# Patient Record
Sex: MALE | Race: WHITE | Employment: PART TIME | URBAN - METROPOLITAN AREA
[De-identification: names, ages, dates, MRNs, and addresses within clinical notes are randomized per-mention and may not be internally consistent; named-entity substitution may affect disease eponyms.]

---
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
## 2021-02-06 ENCOUNTER — HOSPITAL ENCOUNTER (OUTPATIENT)
Age: 21
Discharge: HOME | End: 2021-02-06
Payer: COMMERCIAL

## 2021-02-06 VITALS
OXYGEN SATURATION: 98 % | HEART RATE: 101 BPM | HEIGHT: 69 IN | TEMPERATURE: 97 F | BODY MASS INDEX: 26.36 KG/M2 | SYSTOLIC BLOOD PRESSURE: 149 MMHG | RESPIRATION RATE: 18 BRPM | WEIGHT: 178 LBS | DIASTOLIC BLOOD PRESSURE: 90 MMHG

## 2021-02-06 DIAGNOSIS — Z01.84 COVID-19 VIRUS ANTIBODY NEGATIVE: ICD-10-CM

## 2021-02-06 DIAGNOSIS — J06.9 UPPER RESPIRATORY TRACT INFECTION, UNSPECIFIED TYPE: Primary | ICD-10-CM

## 2021-02-06 LAB — SARS-COV-2 RNA RESP QL NAA+PROBE: NOT DETECTED

## 2021-02-06 PROCEDURE — 99213 OFFICE O/P EST LOW 20 MIN: CPT

## 2021-02-06 PROCEDURE — 99212 OFFICE O/P EST SF 10 MIN: CPT

## 2021-02-06 PROCEDURE — 99202 OFFICE O/P NEW SF 15 MIN: CPT

## 2021-02-06 NOTE — ED INITIAL ASSESSMENT (HPI)
Pt states having rt sided throat pain radiating to rt ear. States having post nasal drip. Denies fever or cough. Denies sinus pain.

## 2021-02-06 NOTE — ED PROVIDER NOTES
Patient Seen in: [facility]      History   Patient presents with:  Sore Throat    Stated Complaint: SINUS INFECTION X 4 DAYS    HPI/Subjective:   HPI    CHIEF COMPLAINT: Sinus drainage, right ear discomfort, mild sore throat    HISTORY OF Pulse 101   Temp 97.4 °F (36.3 °C) (Temporal)   Resp 18   Ht 175.3 cm (5' 9\")   Wt 80.7 kg   SpO2 98%   BMI 26.29 kg/m²         Physical Exam    Vital signs and nursing notes reviewed    General Appearance: alert and oriented x 4, no acute distress  Head: to help prevent relapse or worsening. I discussed the case with the patient and they had no questions, complaints, or concerns. Patient states they understand diagnosis, followup plan and agree with and understand  discharge instructions and plan.  I ans

## 2021-09-21 ENCOUNTER — TELEMEDICINE (OUTPATIENT)
Dept: FAMILY MEDICINE CLINIC | Facility: CLINIC | Age: 21
End: 2021-09-21

## 2021-09-21 ENCOUNTER — LAB ENCOUNTER (OUTPATIENT)
Dept: LAB | Facility: HOSPITAL | Age: 21
End: 2021-09-21
Attending: STUDENT IN AN ORGANIZED HEALTH CARE EDUCATION/TRAINING PROGRAM
Payer: COMMERCIAL

## 2021-09-21 ENCOUNTER — TELEPHONE (OUTPATIENT)
Dept: FAMILY MEDICINE CLINIC | Facility: CLINIC | Age: 21
End: 2021-09-21

## 2021-09-21 DIAGNOSIS — J06.9 VIRAL URI WITH COUGH: Primary | ICD-10-CM

## 2021-09-21 DIAGNOSIS — R09.82 POSTNASAL DRIP: ICD-10-CM

## 2021-09-21 DIAGNOSIS — J06.9 VIRAL URI WITH COUGH: ICD-10-CM

## 2021-09-21 LAB
ADENOVIRUS PCR:: NOT DETECTED
B PARAPERT DNA SPEC QL NAA+PROBE: NOT DETECTED
B PERT DNA SPEC QL NAA+PROBE: NOT DETECTED
C PNEUM DNA SPEC QL NAA+PROBE: NOT DETECTED
CORONAVIRUS 229E PCR:: NOT DETECTED
CORONAVIRUS HKU1 PCR:: NOT DETECTED
CORONAVIRUS NL63 PCR:: NOT DETECTED
CORONAVIRUS OC43 PCR:: NOT DETECTED
FLUAV RNA SPEC QL NAA+PROBE: NOT DETECTED
FLUBV RNA SPEC QL NAA+PROBE: NOT DETECTED
METAPNEUMOVIRUS PCR:: NOT DETECTED
MYCOPLASMA PNEUMONIA PCR:: NOT DETECTED
PARAINFLUENZA 1 PCR:: NOT DETECTED
PARAINFLUENZA 2 PCR:: NOT DETECTED
PARAINFLUENZA 3 PCR:: NOT DETECTED
PARAINFLUENZA 4 PCR:: NOT DETECTED
RHINOVIRUS/ENTERO PCR:: NOT DETECTED
RSV RNA SPEC QL NAA+PROBE: NOT DETECTED
SARS-COV-2 RNA NPH QL NAA+NON-PROBE: NOT DETECTED

## 2021-09-21 PROCEDURE — 99213 OFFICE O/P EST LOW 20 MIN: CPT | Performed by: STUDENT IN AN ORGANIZED HEALTH CARE EDUCATION/TRAINING PROGRAM

## 2021-09-21 PROCEDURE — 0202U NFCT DS 22 TRGT SARS-COV-2: CPT

## 2021-09-21 RX ORDER — FLUTICASONE PROPIONATE 50 MCG
2 SPRAY, SUSPENSION (ML) NASAL DAILY PRN
Qty: 16 G | Refills: 0 | Status: SHIPPED | OUTPATIENT
Start: 2021-09-21

## 2021-09-21 NOTE — TELEPHONE ENCOUNTER
Pt mom calling states pt has not been feeling well since last night. Pt mom states pt stated he \"felt like he had a fever but had a normal temp with thermometer\"     Pt symptoms sore throat, body aches.     Pt mom states no loss of smell or taste, no s

## 2021-09-21 NOTE — TELEPHONE ENCOUNTER
Flu like sx since last night   Sore throat, body aches  Feverish   Fatigue  \"I feel like I got hit by a truck\". Vaccinated   Unknown exposure    Dr. Meade Pac off today  Dr. Orlin Kaur, are you OK to teleV ? Or can send to ?      Pls advise   Thank you

## 2021-09-21 NOTE — PROGRESS NOTES
Virtual Virtual Check-In    Harris Melendez verbally consents to a Virtual Video Check-In service on 09/21/21. Patient understands and accepts financial responsibility for any deductible, co-insurance and/or co-pays associated with this service.  This visit i Medications   Medication Sig Dispense Refill   • Albuterol Sulfate  (90 Base) MCG/ACT Inhalation Aero Soln Inhale 2 puffs into the lungs every 6 (six) hours as needed for Wheezing.  (Patient not taking: Reported on 1/16/2020 ) 1 Inhaler 0          As

## 2021-12-19 ENCOUNTER — OFFICE VISIT (OUTPATIENT)
Dept: FAMILY MEDICINE CLINIC | Facility: CLINIC | Age: 21
End: 2021-12-19
Payer: COMMERCIAL

## 2021-12-19 VITALS
OXYGEN SATURATION: 99 % | BODY MASS INDEX: 25.62 KG/M2 | WEIGHT: 173 LBS | HEART RATE: 65 BPM | TEMPERATURE: 99 F | HEIGHT: 69 IN | RESPIRATION RATE: 14 BRPM

## 2021-12-19 DIAGNOSIS — R09.81 NASAL CONGESTION: ICD-10-CM

## 2021-12-19 DIAGNOSIS — J03.90 ACUTE TONSILLITIS, UNSPECIFIED ETIOLOGY: Primary | ICD-10-CM

## 2021-12-19 DIAGNOSIS — J02.9 SORE THROAT: ICD-10-CM

## 2021-12-19 PROCEDURE — 87880 STREP A ASSAY W/OPTIC: CPT | Performed by: FAMILY MEDICINE

## 2021-12-19 PROCEDURE — 3008F BODY MASS INDEX DOCD: CPT | Performed by: FAMILY MEDICINE

## 2021-12-19 PROCEDURE — 99213 OFFICE O/P EST LOW 20 MIN: CPT | Performed by: FAMILY MEDICINE

## 2021-12-19 RX ORDER — CEPHALEXIN 500 MG/1
500 CAPSULE ORAL 2 TIMES DAILY
Qty: 20 CAPSULE | Refills: 0 | Status: SHIPPED | OUTPATIENT
Start: 2021-12-19

## 2021-12-19 NOTE — PATIENT INSTRUCTIONS
Pharyngitis: Strep (Presumed)    You have pharyngitis (sore throat). The healthcare staff may think your sore throat is caused by a bacteria called Group A streptococcus (strep). This is often called strep throat.  This is diagnosed by either a rapid stre help reduce throat pain. Dissolve 1/2 teaspoon of salt in 1 glass of warm water.   · Don’t eat salty or spicy foods. These can irritate the throat.     Follow-up care  Follow up with your healthcare provider or our staff if you don't feel better in 72 hours

## 2021-12-19 NOTE — PROGRESS NOTES
Jose Sandoval is a 22year old adult. S:  Patient presents today with the following concerns:  · Sore throat and ear pain with swallowing on the right side. Stuffy and runny nose. Started 2 days ago and worsening. No fevers. No cough or dyspnea.   No is Negative. ASSESSMENT AND PLAN:  Hilda Vasques is a 22year old adult.   Acute tonsillitis, unspecified etiology  (primary encounter diagnosis)  Sore throat  Nasal congestion    Orders Placed This Encounter      Rapid Strep      [facility] COLLECT [facility] Cov

## 2022-01-06 ENCOUNTER — TELEPHONE (OUTPATIENT)
Dept: FAMILY MEDICINE CLINIC | Facility: CLINIC | Age: 22
End: 2022-01-06

## 2022-01-07 ENCOUNTER — TELEMEDICINE (OUTPATIENT)
Dept: FAMILY MEDICINE CLINIC | Facility: CLINIC | Age: 22
End: 2022-01-07
Payer: COMMERCIAL

## 2022-01-07 DIAGNOSIS — U07.1 COVID-19 VIRUS INFECTION: Primary | ICD-10-CM

## 2022-01-07 PROCEDURE — 99213 OFFICE O/P EST LOW 20 MIN: CPT | Performed by: FAMILY MEDICINE

## 2022-01-07 NOTE — PROGRESS NOTES
Subjective     HPI:     Telehealth outside of [address] Verbal Consent   I conducted a telehealth visit with Melvina Barajas today, 01/07/22, which was completed using two-way, real-time interactive audio and video communication.  This has been done and headache along with cough and congestion 2 days ago and then tested positive for COVID-19 yesterday. He has had both doses of COVID-19 vaccination but not booster.   States he is doing a little bit better today but still having some headaches and cough around others for 10 days. • Test on day 5, if possible  If you develop symptoms get a test and stay home.       If you:  Completed the primary series of Pfizer or Moderna vaccine over 6 months ago and are not boosted  OR  Completed the primary series of J

## 2022-04-02 ENCOUNTER — OFFICE VISIT (OUTPATIENT)
Dept: FAMILY MEDICINE CLINIC | Facility: CLINIC | Age: 22
End: 2022-04-02
Payer: COMMERCIAL

## 2022-04-02 VITALS
SYSTOLIC BLOOD PRESSURE: 132 MMHG | BODY MASS INDEX: 23.7 KG/M2 | WEIGHT: 160 LBS | HEIGHT: 69 IN | HEART RATE: 78 BPM | RESPIRATION RATE: 18 BRPM | OXYGEN SATURATION: 99 % | TEMPERATURE: 98 F | DIASTOLIC BLOOD PRESSURE: 68 MMHG

## 2022-04-02 DIAGNOSIS — J06.9 VIRAL UPPER RESPIRATORY ILLNESS: Primary | ICD-10-CM

## 2022-04-02 PROCEDURE — 3078F DIAST BP <80 MM HG: CPT | Performed by: NURSE PRACTITIONER

## 2022-04-02 PROCEDURE — 3008F BODY MASS INDEX DOCD: CPT | Performed by: NURSE PRACTITIONER

## 2022-04-02 PROCEDURE — 3075F SYST BP GE 130 - 139MM HG: CPT | Performed by: NURSE PRACTITIONER

## 2022-04-02 PROCEDURE — 99213 OFFICE O/P EST LOW 20 MIN: CPT | Performed by: NURSE PRACTITIONER

## 2022-04-03 LAB — SARS-COV-2 RNA RESP QL NAA+PROBE: NOT DETECTED

## 2022-08-11 ENCOUNTER — OFFICE VISIT (OUTPATIENT)
Dept: FAMILY MEDICINE CLINIC | Facility: CLINIC | Age: 22
End: 2022-08-11
Payer: COMMERCIAL

## 2022-08-11 VITALS
DIASTOLIC BLOOD PRESSURE: 72 MMHG | OXYGEN SATURATION: 98 % | WEIGHT: 178 LBS | TEMPERATURE: 98 F | SYSTOLIC BLOOD PRESSURE: 122 MMHG | HEIGHT: 69 IN | RESPIRATION RATE: 16 BRPM | BODY MASS INDEX: 26.36 KG/M2 | HEART RATE: 84 BPM

## 2022-08-11 DIAGNOSIS — Z71.3 ENCOUNTER FOR DIETARY COUNSELING AND SURVEILLANCE: ICD-10-CM

## 2022-08-11 DIAGNOSIS — Z23 NEED FOR VACCINATION: ICD-10-CM

## 2022-08-11 DIAGNOSIS — Z00.00 EXAMINATION, ROUTINE, OVER 18 YEARS OF AGE: Primary | ICD-10-CM

## 2022-08-11 DIAGNOSIS — Z71.82 EXERCISE COUNSELING: ICD-10-CM

## 2022-08-11 PROCEDURE — 90715 TDAP VACCINE 7 YRS/> IM: CPT | Performed by: FAMILY MEDICINE

## 2022-08-11 PROCEDURE — 3008F BODY MASS INDEX DOCD: CPT | Performed by: FAMILY MEDICINE

## 2022-08-11 PROCEDURE — 3078F DIAST BP <80 MM HG: CPT | Performed by: FAMILY MEDICINE

## 2022-08-11 PROCEDURE — 3074F SYST BP LT 130 MM HG: CPT | Performed by: FAMILY MEDICINE

## 2022-08-11 PROCEDURE — 90471 IMMUNIZATION ADMIN: CPT | Performed by: FAMILY MEDICINE

## 2022-08-11 PROCEDURE — 99395 PREV VISIT EST AGE 18-39: CPT | Performed by: FAMILY MEDICINE

## 2022-11-11 ENCOUNTER — OFFICE VISIT (OUTPATIENT)
Dept: FAMILY MEDICINE CLINIC | Facility: CLINIC | Age: 22
End: 2022-11-11
Payer: COMMERCIAL

## 2022-11-11 VITALS
SYSTOLIC BLOOD PRESSURE: 130 MMHG | DIASTOLIC BLOOD PRESSURE: 90 MMHG | HEART RATE: 80 BPM | TEMPERATURE: 99 F | HEIGHT: 69 IN | WEIGHT: 170 LBS | RESPIRATION RATE: 18 BRPM | BODY MASS INDEX: 25.18 KG/M2 | OXYGEN SATURATION: 96 %

## 2022-11-11 DIAGNOSIS — R05.1 ACUTE COUGH: ICD-10-CM

## 2022-11-11 DIAGNOSIS — J01.00 ACUTE NON-RECURRENT MAXILLARY SINUSITIS: Primary | ICD-10-CM

## 2022-11-11 PROCEDURE — 99213 OFFICE O/P EST LOW 20 MIN: CPT | Performed by: FAMILY MEDICINE

## 2022-11-11 PROCEDURE — 3008F BODY MASS INDEX DOCD: CPT | Performed by: FAMILY MEDICINE

## 2022-11-11 PROCEDURE — 3080F DIAST BP >= 90 MM HG: CPT | Performed by: FAMILY MEDICINE

## 2022-11-11 PROCEDURE — 3075F SYST BP GE 130 - 139MM HG: CPT | Performed by: FAMILY MEDICINE

## 2022-11-11 RX ORDER — AMOXICILLIN AND CLAVULANATE POTASSIUM 875; 125 MG/1; MG/1
1 TABLET, FILM COATED ORAL 2 TIMES DAILY
Qty: 20 TABLET | Refills: 0 | Status: SHIPPED | OUTPATIENT
Start: 2022-11-11 | End: 2022-11-21

## 2022-11-11 RX ORDER — PREDNISONE 20 MG/1
TABLET ORAL
Qty: 10 TABLET | Refills: 0 | Status: SHIPPED | OUTPATIENT
Start: 2022-11-11

## 2022-11-11 NOTE — PATIENT INSTRUCTIONS
Take antibiotics with food and plenty of water. Eat yogurt or take probiotic daily. (Liyah Dailey is a good example of an OTC probiotic)  Make sure to finish the entire antibiotic treatment. Take prednisone-- 2 tabs once daily for 5 days. Take with food. While you are on steroids it is preferred that you take Tylenol for pain if needed rather than ibuprofen (Motrin/Ibuprofen) or Aleve. Increase fluids and rest.   Use OTC meds for comfort as needed--  Ibuprofen/Tylenol for fever/pain  Use Benadryl at bedtime to reduce drainage and promote rest.  Zyrtec/Claritin/Allegra in the AM to reduce nasal drainage without sedation. Use saline nasal sprays to reduce congestion and thin secretions. Use Delsym for cough. Consider applying shantelle's vapo-rub or eucayptus oil to chest and feet at bedtime to reduce chest and nasal congestion. Warm tea with honey, cough lozenges, vaporizers/steam etc.    Monitor symptoms and contact the office if no better in 2-3 days.

## 2023-06-21 ENCOUNTER — OFFICE VISIT (OUTPATIENT)
Dept: FAMILY MEDICINE CLINIC | Facility: CLINIC | Age: 23
End: 2023-06-21
Payer: COMMERCIAL

## 2023-06-21 VITALS
TEMPERATURE: 99 F | DIASTOLIC BLOOD PRESSURE: 76 MMHG | RESPIRATION RATE: 18 BRPM | HEART RATE: 89 BPM | OXYGEN SATURATION: 97 % | HEIGHT: 69 IN | SYSTOLIC BLOOD PRESSURE: 124 MMHG | BODY MASS INDEX: 26.36 KG/M2 | WEIGHT: 178 LBS

## 2023-06-21 DIAGNOSIS — R05.1 ACUTE COUGH: ICD-10-CM

## 2023-06-21 DIAGNOSIS — J01.20 ACUTE NON-RECURRENT ETHMOIDAL SINUSITIS: Primary | ICD-10-CM

## 2023-06-21 PROCEDURE — 99213 OFFICE O/P EST LOW 20 MIN: CPT | Performed by: NURSE PRACTITIONER

## 2023-06-21 PROCEDURE — 3008F BODY MASS INDEX DOCD: CPT | Performed by: NURSE PRACTITIONER

## 2023-06-21 PROCEDURE — 3078F DIAST BP <80 MM HG: CPT | Performed by: NURSE PRACTITIONER

## 2023-06-21 PROCEDURE — 3074F SYST BP LT 130 MM HG: CPT | Performed by: NURSE PRACTITIONER

## 2023-06-21 RX ORDER — AMOXICILLIN AND CLAVULANATE POTASSIUM 875; 125 MG/1; MG/1
1 TABLET, FILM COATED ORAL 2 TIMES DAILY
Qty: 20 TABLET | Refills: 0 | Status: SHIPPED | OUTPATIENT
Start: 2023-06-21 | End: 2023-07-01

## 2023-07-01 ENCOUNTER — OFFICE VISIT (OUTPATIENT)
Dept: FAMILY MEDICINE CLINIC | Facility: CLINIC | Age: 23
End: 2023-07-01
Payer: COMMERCIAL

## 2023-07-01 VITALS
BODY MASS INDEX: 26.66 KG/M2 | DIASTOLIC BLOOD PRESSURE: 85 MMHG | RESPIRATION RATE: 16 BRPM | SYSTOLIC BLOOD PRESSURE: 129 MMHG | WEIGHT: 180 LBS | TEMPERATURE: 98 F | HEART RATE: 86 BPM | OXYGEN SATURATION: 98 % | HEIGHT: 69 IN

## 2023-07-01 DIAGNOSIS — R05.1 ACUTE COUGH: ICD-10-CM

## 2023-07-01 DIAGNOSIS — J01.20 ACUTE NON-RECURRENT ETHMOIDAL SINUSITIS: Primary | ICD-10-CM

## 2023-07-01 PROCEDURE — 3079F DIAST BP 80-89 MM HG: CPT | Performed by: PHYSICIAN ASSISTANT

## 2023-07-01 PROCEDURE — 3008F BODY MASS INDEX DOCD: CPT | Performed by: PHYSICIAN ASSISTANT

## 2023-07-01 PROCEDURE — 3074F SYST BP LT 130 MM HG: CPT | Performed by: PHYSICIAN ASSISTANT

## 2023-07-01 PROCEDURE — 99213 OFFICE O/P EST LOW 20 MIN: CPT | Performed by: PHYSICIAN ASSISTANT

## 2023-07-01 RX ORDER — DEXTROMETHORPHAN HYDROBROMIDE AND PROMETHAZINE HYDROCHLORIDE 15; 6.25 MG/5ML; MG/5ML
5 SYRUP ORAL 4 TIMES DAILY PRN
Qty: 118 ML | Refills: 0 | Status: SHIPPED | OUTPATIENT
Start: 2023-07-01 | End: 2023-07-11

## 2023-11-06 ENCOUNTER — OFFICE VISIT (OUTPATIENT)
Dept: FAMILY MEDICINE CLINIC | Facility: CLINIC | Age: 23
End: 2023-11-06
Payer: COMMERCIAL

## 2023-11-06 VITALS
HEIGHT: 69 IN | BODY MASS INDEX: 29.62 KG/M2 | HEART RATE: 94 BPM | SYSTOLIC BLOOD PRESSURE: 139 MMHG | DIASTOLIC BLOOD PRESSURE: 78 MMHG | WEIGHT: 200 LBS | TEMPERATURE: 99 F | OXYGEN SATURATION: 97 %

## 2023-11-06 DIAGNOSIS — J01.00 ACUTE NON-RECURRENT MAXILLARY SINUSITIS: Primary | ICD-10-CM

## 2023-11-06 DIAGNOSIS — B34.9 VIRAL SYNDROME: ICD-10-CM

## 2023-11-06 PROCEDURE — 3008F BODY MASS INDEX DOCD: CPT | Performed by: PHYSICIAN ASSISTANT

## 2023-11-06 PROCEDURE — 99213 OFFICE O/P EST LOW 20 MIN: CPT | Performed by: PHYSICIAN ASSISTANT

## 2023-11-06 PROCEDURE — 3075F SYST BP GE 130 - 139MM HG: CPT | Performed by: PHYSICIAN ASSISTANT

## 2023-11-06 PROCEDURE — 3078F DIAST BP <80 MM HG: CPT | Performed by: PHYSICIAN ASSISTANT

## 2023-11-06 RX ORDER — AMOXICILLIN AND CLAVULANATE POTASSIUM 875; 125 MG/1; MG/1
1 TABLET, FILM COATED ORAL 2 TIMES DAILY
Qty: 14 TABLET | Refills: 0 | Status: SHIPPED | OUTPATIENT
Start: 2023-11-06 | End: 2023-11-13

## 2023-11-06 NOTE — PATIENT INSTRUCTIONS
-Flonase  -Nielmed sinus rinse  -Robitussin  -If symptoms are not improving in the next few days, may take antibiotic

## 2023-11-12 ENCOUNTER — APPOINTMENT (OUTPATIENT)
Dept: GENERAL RADIOLOGY | Age: 23
End: 2023-11-12
Attending: PHYSICIAN ASSISTANT
Payer: COMMERCIAL

## 2023-11-12 ENCOUNTER — HOSPITAL ENCOUNTER (OUTPATIENT)
Age: 23
Discharge: HOME | End: 2023-11-12
Payer: COMMERCIAL

## 2023-11-12 VITALS
RESPIRATION RATE: 20 BRPM | BODY MASS INDEX: 28.88 KG/M2 | OXYGEN SATURATION: 96 % | TEMPERATURE: 98 F | SYSTOLIC BLOOD PRESSURE: 133 MMHG | HEIGHT: 69 IN | DIASTOLIC BLOOD PRESSURE: 77 MMHG | HEART RATE: 90 BPM | WEIGHT: 195 LBS

## 2023-11-12 DIAGNOSIS — J18.9 PNEUMONIA OF RIGHT LOWER LOBE DUE TO INFECTIOUS ORGANISM: Primary | ICD-10-CM

## 2023-11-12 PROCEDURE — 99213 OFFICE O/P EST LOW 20 MIN: CPT

## 2023-11-12 PROCEDURE — 71046 X-RAY EXAM CHEST 2 VIEWS: CPT | Performed by: PHYSICIAN ASSISTANT

## 2023-11-12 RX ORDER — AMOXICILLIN AND CLAVULANATE POTASSIUM 875; 125 MG/1; MG/1
1 TABLET, FILM COATED ORAL 2 TIMES DAILY
Qty: 10 TABLET | Refills: 0 | Status: SHIPPED | OUTPATIENT
Start: 2023-11-12 | End: 2023-11-17

## 2023-11-12 RX ORDER — AZITHROMYCIN 250 MG/1
TABLET, FILM COATED ORAL
Qty: 6 TABLET | Refills: 0 | Status: SHIPPED | OUTPATIENT
Start: 2023-11-12 | End: 2023-11-17

## 2023-11-12 NOTE — ED INITIAL ASSESSMENT (HPI)
Cough, headaches, body aches, chills, chest congestion x2 weeks. Seen at walk in clinic, prescribed amoxicillin 5-6 days ago. States that symptoms are not improving.

## 2023-11-12 NOTE — DISCHARGE INSTRUCTIONS
Continue albuterol. Take additional azithromycin and extend Augmentin course.   For any worsening, please report to the hospital.

## 2023-11-17 ENCOUNTER — OFFICE VISIT (OUTPATIENT)
Dept: FAMILY MEDICINE CLINIC | Facility: CLINIC | Age: 23
End: 2023-11-17
Payer: COMMERCIAL

## 2023-11-17 VITALS
HEART RATE: 89 BPM | OXYGEN SATURATION: 98 % | HEIGHT: 69 IN | DIASTOLIC BLOOD PRESSURE: 70 MMHG | RESPIRATION RATE: 18 BRPM | WEIGHT: 194 LBS | TEMPERATURE: 98 F | SYSTOLIC BLOOD PRESSURE: 120 MMHG | BODY MASS INDEX: 28.73 KG/M2

## 2023-11-17 DIAGNOSIS — J18.9 PNEUMONIA OF RIGHT LOWER LOBE DUE TO INFECTIOUS ORGANISM: Primary | ICD-10-CM

## 2023-11-17 PROCEDURE — 3008F BODY MASS INDEX DOCD: CPT | Performed by: FAMILY MEDICINE

## 2023-11-17 PROCEDURE — 3078F DIAST BP <80 MM HG: CPT | Performed by: FAMILY MEDICINE

## 2023-11-17 PROCEDURE — 3074F SYST BP LT 130 MM HG: CPT | Performed by: FAMILY MEDICINE

## 2023-11-17 PROCEDURE — 99214 OFFICE O/P EST MOD 30 MIN: CPT | Performed by: FAMILY MEDICINE

## 2023-11-17 RX ORDER — LEVOFLOXACIN 500 MG/1
500 TABLET, FILM COATED ORAL DAILY
Qty: 10 TABLET | Refills: 0 | Status: SHIPPED | OUTPATIENT
Start: 2023-11-17 | End: 2023-11-27

## 2024-02-21 ENCOUNTER — OFFICE VISIT (OUTPATIENT)
Dept: FAMILY MEDICINE CLINIC | Facility: CLINIC | Age: 24
End: 2024-02-21
Payer: COMMERCIAL

## 2024-02-21 VITALS
WEIGHT: 182 LBS | RESPIRATION RATE: 18 BRPM | HEART RATE: 106 BPM | BODY MASS INDEX: 27.58 KG/M2 | OXYGEN SATURATION: 98 % | DIASTOLIC BLOOD PRESSURE: 68 MMHG | HEIGHT: 68 IN | TEMPERATURE: 99 F | SYSTOLIC BLOOD PRESSURE: 112 MMHG

## 2024-02-21 DIAGNOSIS — J01.00 ACUTE NON-RECURRENT MAXILLARY SINUSITIS: Primary | ICD-10-CM

## 2024-02-21 PROCEDURE — 99213 OFFICE O/P EST LOW 20 MIN: CPT | Performed by: NURSE PRACTITIONER

## 2024-02-21 PROCEDURE — 3074F SYST BP LT 130 MM HG: CPT | Performed by: NURSE PRACTITIONER

## 2024-02-21 PROCEDURE — 3078F DIAST BP <80 MM HG: CPT | Performed by: NURSE PRACTITIONER

## 2024-02-21 PROCEDURE — 3008F BODY MASS INDEX DOCD: CPT | Performed by: NURSE PRACTITIONER

## 2024-02-21 RX ORDER — AMOXICILLIN AND CLAVULANATE POTASSIUM 875; 125 MG/1; MG/1
1 TABLET, FILM COATED ORAL 2 TIMES DAILY
Qty: 20 TABLET | Refills: 0 | Status: SHIPPED | OUTPATIENT
Start: 2024-02-21 | End: 2024-03-02

## 2024-02-21 NOTE — PROGRESS NOTES
CHIEF COMPLAINT:     Chief Complaint   Patient presents with    Sinus Problem     Sinus pressure, right ear pain, and jaw pain. Symptoms started yesterday  Pt tested positive for covid last week   OTC; Dayquill        HPI:   Prabhu Calderon is a 23 year old adult who presents for sinus congestion for  10  days. Reports last week had some runny nose, congestion, cold-like symptoms.  Started not being able to taste, so took a Covid test and was positive.  Symptoms have been worsening since onset. Sinus congestion/pain is described as a pressure and is located mainly maxillary. Reports past 2 days with Right sided sinus pressure.  Reports clear nasal discharge.  Nothing makes symptoms better. Has treated symptoms with O.  Patient also reports seldom cough, fullness in Right ear.  Reports post nasal drip.  Denies fever, dental pain, tinnitus, N/V/D.  NO wheezing/sob.  Reports hx of sinus infections in the past.       Current Outpatient Medications   Medication Sig Dispense Refill    amoxicillin clavulanate 875-125 MG Oral Tab Take 1 tablet by mouth 2 (two) times daily for 10 days. 20 tablet 0    Albuterol Sulfate  (90 Base) MCG/ACT Inhalation Aero Soln Inhale 2 puffs into the lungs every 6 (six) hours as needed for Wheezing. 1 Inhaler 0      Past Medical History:   Diagnosis Date    Environmental allergies       Past Surgical History:   Procedure Laterality Date    TONSILLECTOMY        No family history on file.   Social History     Socioeconomic History    Marital status: Single   Tobacco Use    Smoking status: Never    Smokeless tobacco: Never   Vaping Use    Vaping Use: Every day    Substances: Nicotine    Devices: Disposable   Substance and Sexual Activity    Alcohol use: No    Drug use: No    Sexual activity: Not Currently   Other Topics Concern    Caffeine Concern Yes     Comment: soda or coffee occ    Exercise No         REVIEW OF SYSTEMS:   GENERAL: feels well otherwise, no unplanned weight change,  ok  appetite  SKIN: no rashes or abnormal skin lesions  HEENT: See HPI.    LUNGS: denies shortness of breath or wheezing, See HPI  CARDIOVASCULAR: denies chest pain or palpitations   GI: denies N/V/C or abdominal pain  NEURO: + sinus headaches.  No numbness or tingling in face.    EXAM:   /68   Pulse 106   Temp 99.4 °F (37.4 °C) (Temporal)   Resp 18   Ht 5' 8\" (1.727 m)   Wt 182 lb (82.6 kg)   SpO2 98%   BMI 27.67 kg/m²   GENERAL: well developed, well nourished,in no apparent distress  SKIN: no rashes,no suspicious lesions  HEAD: atraumatic, normocephalic, + right sided tenderness on palpation of maxillary sinuses  EYES: conjunctiva clear, EOM intact  EARS: TM's pearly, no  bulging, no retraction, no fluid, bony landmarks visualized  NOSE: nostrils patent, purulent nasal mucous, nasal mucosa reddened and edematous R>L  THROAT: oral mucosa pink, moist. No visible dental caries. Posterior pharynx is mildly erythematous. no exudates.  NECK: supple, non-tender  LUNGS: clear to auscultation bilaterally, no wheezes or rhonchi.  No crackles/rales, good air movement throughout.Breathing is non labored.  CARDIO: RRR without murmur  EXTREMITIES: no cyanosis, clubbing or edema  LYMPH:  NO cervical lymphadenopathy.        ASSESSMENT AND PLAN:     Prabhu Calderon is a 23 year old adult who presents with   ASSESSMENT:   Encounter Diagnosis   Name Primary?    Acute non-recurrent maxillary sinusitis Yes       PLAN: Meds as below. Mucinex to help thin secretions.  Increase fluids and rest.  Comfort care as described in Patient Instructions.Follow up with PCP in 2-3 days if no improvement, sooner if worsening. If any difficulty breathing, SOB, or wheezing seek emergent care.       Meds & Refills for this Visit:  Requested Prescriptions     Signed Prescriptions Disp Refills    amoxicillin clavulanate 875-125 MG Oral Tab 20 tablet 0     Sig: Take 1 tablet by mouth 2 (two) times daily for 10 days.       Risks, benefits, and side  effects of medication explained and discussed.    There are no Patient Instructions on file for this visit.    The patient indicates understanding of these issues and agrees to the plan.  The patient is asked to return if sx's persist or worsen.    Increase fluids, Motrin/Tylenol prn, rest.  Patient is to follow up if fever greater than or equal to 100.4 persists for 72 hours.

## 2024-04-22 ENCOUNTER — MED REC SCAN ONLY (OUTPATIENT)
Dept: FAMILY MEDICINE CLINIC | Facility: CLINIC | Age: 24
End: 2024-04-22

## 2024-04-22 ENCOUNTER — OFFICE VISIT (OUTPATIENT)
Dept: FAMILY MEDICINE CLINIC | Facility: CLINIC | Age: 24
End: 2024-04-22
Payer: COMMERCIAL

## 2024-04-22 VITALS
OXYGEN SATURATION: 98 % | TEMPERATURE: 99 F | RESPIRATION RATE: 16 BRPM | WEIGHT: 185 LBS | SYSTOLIC BLOOD PRESSURE: 132 MMHG | BODY MASS INDEX: 27.4 KG/M2 | HEIGHT: 69 IN | DIASTOLIC BLOOD PRESSURE: 86 MMHG | HEART RATE: 105 BPM

## 2024-04-22 DIAGNOSIS — J40 SINOBRONCHITIS: Primary | ICD-10-CM

## 2024-04-22 DIAGNOSIS — J32.9 SINOBRONCHITIS: Primary | ICD-10-CM

## 2024-04-22 DIAGNOSIS — J30.2 SEASONAL ALLERGIC RHINITIS, UNSPECIFIED TRIGGER: ICD-10-CM

## 2024-04-22 PROCEDURE — 99213 OFFICE O/P EST LOW 20 MIN: CPT | Performed by: PHYSICIAN ASSISTANT

## 2024-04-22 PROCEDURE — 3008F BODY MASS INDEX DOCD: CPT | Performed by: PHYSICIAN ASSISTANT

## 2024-04-22 PROCEDURE — 3075F SYST BP GE 130 - 139MM HG: CPT | Performed by: PHYSICIAN ASSISTANT

## 2024-04-22 PROCEDURE — 3079F DIAST BP 80-89 MM HG: CPT | Performed by: PHYSICIAN ASSISTANT

## 2024-04-22 RX ORDER — AMOXICILLIN AND CLAVULANATE POTASSIUM 875; 125 MG/1; MG/1
1 TABLET, FILM COATED ORAL 2 TIMES DAILY
Qty: 20 TABLET | Refills: 0 | Status: SHIPPED | OUTPATIENT
Start: 2024-04-22 | End: 2024-05-02

## 2024-04-22 NOTE — PROGRESS NOTES
CHIEF COMPLAINT:     Chief Complaint   Patient presents with    Cough     1 week, worsening last 2 days, chest congestion, difficulty breathing in the morning, nasal congestion, R eye pain, runny nose, OTC mucinex, nasal spray         HPI:   Prabhu Calderon is a 23 year old adult who presents for upper respiratory symptoms for 7-8 days, acutely worsening over past 2 days.  Reports onset of URI sx with sinus pressure/congestion, productive cough, and SOB/LEDESMA in AMs only.   Purulent green rhinorrhea and sputum with cough.   Sinus pressure worse over R brow  Evaluated at [facility] on [facility] (at time touring with band), tx for URI/allergies with ipratropium nasal spray, albuterol MDI, phenylephrine, and advised to start Claritin on 4/23/24.   Known h/o seasonal allergic rhinitis and mild intermittent extrinsic asthma.   AM SOB/LEDESMA resolves after expectorating chest congestion and does not occur throughout remainder of day.   Afebrile, no pleuritic pain, no hemoptysis. No h/o sinus surgery. Prior h/o tonsillectomy.     Purchased flonase day of exam at [facility] clinic, however advised to use ipratropium nasal spray and has not used Flonase.       Current Outpatient Medications   Medication Sig Dispense Refill    amoxicillin clavulanate 875-125 MG Oral Tab Take 1 tablet by mouth 2 (two) times daily for 10 days. 20 tablet 0    Albuterol Sulfate  (90 Base) MCG/ACT Inhalation Aero Soln Inhale 2 puffs into the lungs every 6 (six) hours as needed for Wheezing. 1 Inhaler 0      Past Medical History:    Environmental allergies      Past Surgical History:   Procedure Laterality Date    Tonsillectomy           Social History     Socioeconomic History    Marital status: Single   Tobacco Use    Smoking status: Never    Smokeless tobacco: Never   Vaping Use    Vaping status: Every Day    Substances: Nicotine    Devices: Disposable   Substance and Sexual Activity    Alcohol use: No    Drug use: No    Sexual activity: Not  Currently   Other Topics Concern    Caffeine Concern Yes     Comment: soda or coffee occ    Exercise No         REVIEW OF SYSTEMS:   GENERAL: normal appetite  SKIN: no rashes or abnormal skin lesions  HEENT: See HPI  LUNGS: See HPI  CARDIOVASCULAR: denies chest pain or palpitations   GI: denies N/V/C or abdominal pain      EXAM:   /86   Pulse 105   Temp 99 °F (37.2 °C)   Resp 16   Ht 5' 9\" (1.753 m)   Wt 185 lb (83.9 kg)   SpO2 98%   BMI 27.32 kg/m²   GENERAL: well developed, well nourished,in no apparent distress  SKIN: no rashes,no suspicious lesions  HEAD: atraumatic, normocephalic.   tenderness on palpation of (+) sinuses  EYES: conjunctiva clear, EOM intact  EARS: TM's clear bilaterally  NOSE: Nostrils patent, clear/green nasal discharge, nasal mucosa erythematous/edematous   THROAT: Oral mucosa pink, moist. Posterior pharynx is mildly injected, thick post nasal drainage, uvula midline, tonsils surgically absent.   NECK: Supple, non-tender  LUNGS: clear to auscultation bilaterally, no wheezes or rhonchi. Breathing is non labored.  CARDIO: RRR without murmur  EXTREMITIES: no cyanosis, clubbing or edema  LYMPH:  shotty ant cervical LAD.       ASSESSMENT AND PLAN:   Prabhu Calderon is a 23 year old adult who presents with upper respiratory symptoms that are consistent with    ASSESSMENT:   Encounter Diagnosis   Name Primary?    Sinobronchitis Yes       PLAN: Meds as below.  Comfort care as described in Patient Instructions    Meds & Refills for this Visit:  Requested Prescriptions     Signed Prescriptions Disp Refills    amoxicillin clavulanate 875-125 MG Oral Tab 20 tablet 0     Sig: Take 1 tablet by mouth 2 (two) times daily for 10 days.     Risks, benefits, and side effects of medication explained and discussed.    The patient indicates understanding of these issues and agrees to the plan.  The patient is asked to f/u with PCP if sx's persist or worsen.  Patient Instructions    Augmentin 875 mg twice  daily for 10 days.    Stop ipratropium nasal spray.    Stat Flonase:  2 sprays in each nostril daily, or 1 spray in each nostril twice daily.  If you develop a nosebleed, stop medication and restart at half the dose (1 spray in each nostril daily) after you have not had a nosebleed for 2 days.  If nosebleed recurs, then stop medication completely.   Recommend oral antihistamine such as oral Benadryl, Claritin, Allegra, Zyrtec, Xyzal (generic is okay).  Benadryl is dosed multiple times daily and may cause sedation.  Restart Flonase, stop ipratropium nasal spray.   Encourage fluids, humidifier/vaporizor at bedside, elevate head of bed (sleep with extra pillow), vapor rub to chest, steam therapy if no fever, warm compresses for sinus pressure if no fever, salt water gargles for sore throat, lozenges for sore throat, may try over the counter saline nasal spray or irrigation kit (use distilled water with irrigation kit) for sinus pressure/congestion, get plenty of rest.  Recommend NSAID as needed for sinus pressure/Headache, take with food (I.e. Advil, Motrin, ibuprofen, Aleve, or naproxen).   Albuterol inhaler as needed for chest congestion, cough, shortness or breath or prior to activity/singing.     Follow up with your primary care provider if your symptoms fail to improve and resolve as anticipated    Go to the Immediate Care or Emergency Department in event of new or worsening symptoms at any time       Dyan Sheriff PA-C

## 2024-04-22 NOTE — PATIENT INSTRUCTIONS
Augmentin 875 mg twice daily for 10 days.    Stop ipratropium nasal spray.    Stat Flonase:  2 sprays in each nostril daily, or 1 spray in each nostril twice daily.  If you develop a nosebleed, stop medication and restart at half the dose (1 spray in each nostril daily) after you have not had a nosebleed for 2 days.  If nosebleed recurs, then stop medication completely.   Recommend oral antihistamine such as oral Benadryl, Claritin, Allegra, Zyrtec, Xyzal (generic is okay).  Benadryl is dosed multiple times daily and may cause sedation.  Restart Flonase, stop ipratropium nasal spray.   Encourage fluids, humidifier/vaporizor at bedside, elevate head of bed (sleep with extra pillow), vapor rub to chest, steam therapy if no fever, warm compresses for sinus pressure if no fever, salt water gargles for sore throat, lozenges for sore throat, may try over the counter saline nasal spray or irrigation kit (use distilled water with irrigation kit) for sinus pressure/congestion, get plenty of rest.  Recommend NSAID as needed for sinus pressure/Headache, take with food (I.e. Advil, Motrin, ibuprofen, Aleve, or naproxen).   Albuterol inhaler as needed for chest congestion, cough, shortness or breath or prior to activity/singing.     Follow up with your primary care provider if your symptoms fail to improve and resolve as anticipated    Go to the Immediate Care or Emergency Department in event of new or worsening symptoms at any time

## 2024-05-16 ENCOUNTER — OFFICE VISIT (OUTPATIENT)
Dept: FAMILY MEDICINE CLINIC | Facility: CLINIC | Age: 24
End: 2024-05-16

## 2024-05-16 ENCOUNTER — TELEPHONE (OUTPATIENT)
Dept: FAMILY MEDICINE CLINIC | Facility: CLINIC | Age: 24
End: 2024-05-16

## 2024-05-16 VITALS
HEIGHT: 69 IN | RESPIRATION RATE: 20 BRPM | SYSTOLIC BLOOD PRESSURE: 120 MMHG | BODY MASS INDEX: 26.81 KG/M2 | WEIGHT: 181 LBS | OXYGEN SATURATION: 98 % | DIASTOLIC BLOOD PRESSURE: 76 MMHG | TEMPERATURE: 98 F | HEART RATE: 74 BPM

## 2024-05-16 DIAGNOSIS — J40 BRONCHITIS: Primary | ICD-10-CM

## 2024-05-16 PROCEDURE — 99214 OFFICE O/P EST MOD 30 MIN: CPT | Performed by: FAMILY MEDICINE

## 2024-05-16 PROCEDURE — 3074F SYST BP LT 130 MM HG: CPT | Performed by: FAMILY MEDICINE

## 2024-05-16 PROCEDURE — 3008F BODY MASS INDEX DOCD: CPT | Performed by: FAMILY MEDICINE

## 2024-05-16 PROCEDURE — 3078F DIAST BP <80 MM HG: CPT | Performed by: FAMILY MEDICINE

## 2024-05-16 RX ORDER — BENZONATATE 200 MG/1
200 CAPSULE ORAL 3 TIMES DAILY PRN
Qty: 40 CAPSULE | Refills: 0 | Status: SHIPPED | OUTPATIENT
Start: 2024-05-16

## 2024-05-16 RX ORDER — LORATADINE 10 MG/1
10 TABLET ORAL DAILY
COMMUNITY
Start: 2024-04-18 | End: 2024-05-18

## 2024-05-16 RX ORDER — PREDNISONE 50 MG/1
50 TABLET ORAL DAILY
Qty: 7 TABLET | Refills: 0 | Status: SHIPPED | OUTPATIENT
Start: 2024-05-16 | End: 2024-05-23

## 2024-05-16 NOTE — TELEPHONE ENCOUNTER
S/w pt  Reports having cough and tight in chest x month  Reports tightness comes and goes, feels worse in am and at night  Reports cough has been productive yellow  No fevers  No CP  Reports wheezing at times  No current distress    Reports he originally had s/t/congestion but no longer    Reports cough mainly now, no congestion    Hx of pneumonia in November    Appt booked today with Dr Corona

## 2024-05-16 NOTE — TELEPHONE ENCOUNTER
1. What are your symptoms? Cough,trouble breathing,tightness in chest         2. How long have you been having these symptoms? About a month         3. Have you done anything already to treat your symptoms? Treated for sinus infection         ADDITIONAL INFO:  transferred live to triage due to symptoms of trouble breathing.

## 2024-05-17 NOTE — PROGRESS NOTES
[facility] Office Note  Chief Complaint:   Chief Complaint   Patient presents with    Chest Congestion    Post Nasal Drip    Cough     Pt c/o coughing in the AM and in the PM       HPI:   This is a 23 year old adult coming in for cough postnasal drip.  The patient states that he has had the symptoms for a while.  He was pierced and treated with antibiotics he states that the mucus production has decreased.      Past Medical History:    Environmental allergies     Past Surgical History:   Procedure Laterality Date    Tonsillectomy       Social History:  Social History     Socioeconomic History    Marital status: Single   Tobacco Use    Smoking status: Never    Smokeless tobacco: Never   Vaping Use    Vaping status: Every Day    Substances: Nicotine    Devices: Disposable   Substance and Sexual Activity    Alcohol use: No    Drug use: No    Sexual activity: Not Currently   Other Topics Concern    Caffeine Concern Yes     Comment: soda or coffee occ    Exercise No     Family History:  No family history on file.  Allergies:  No Known Allergies  Current Meds:  Current Outpatient Medications   Medication Sig Dispense Refill    loratadine 10 MG Oral Tab Take 1 tablet (10 mg total) by mouth daily.      predniSONE 50 MG Oral Tab Take 1 tablet (50 mg total) by mouth daily for 7 days. 7 tablet 0    benzonatate 200 MG Oral Cap Take 1 capsule (200 mg total) by mouth 3 (three) times daily as needed for cough. 40 capsule 0    Albuterol Sulfate  (90 Base) MCG/ACT Inhalation Aero Soln Inhale 2 puffs into the lungs every 6 (six) hours as needed for Wheezing. 1 Inhaler 0      Counseling given: Not Answered       REVIEW OF SYSTEMS:   Consitutional: No fevers, chills, sweats  Eye: No recent visual problems  ENMT: No ear pain nasal congestion sore throat  Respiratory: No shortness of breath, positive cough  Cardiovascular: No chest pain palpitations syncope  Gastrointestinal: No nausea vomiting  diarrhea  Genitourinary: No hematuria  Hema/Lymph no bruising tendency, swollen lymph glands  Endocrine: Negative for excessive thirst excessive hunger  Positive  Medical, surgical, family, and social histories were reviewed      EXAM:   VITALS:   Vitals:    05/16/24 1229   BP: 120/76   Pulse: 74   Resp: 20   Temp: 98 °F (36.7 °C)      GENERAL: well developed, well nourished, in no apparent distress  SKIN: no rashes, no suspicious lesions: Cool and Dry  HEENT: atraumatic, normocephalic, ears and throat are clear    Ears: TM's clear and visible bilaterally, no excess cerumen or erythema.   EYES: Pupils equal round and reactive.  Extraocular motions intact no scleral icterus no injection or drainage  THROAT without erythema tonsillar hypertrophy or exudate.  Uvula midline airway patent  NECK: Given midline.  No JVD or lymphadenopathy supple nontender no meningeal signs   LUNGS: clear to auscultation sounds equal bilaterally no wheezes rales or rhonchi  CARDIO: Regular rate and rhythm without murmurs gallops or rubs      ASSESSMENT AND PLAN:   1. Bronchitis  - predniSONE 50 MG Oral Tab; Take 1 tablet (50 mg total) by mouth daily for 7 days.  Dispense: 7 tablet; Refill: 0  - benzonatate 200 MG Oral Cap; Take 1 capsule (200 mg total) by mouth 3 (three) times daily as needed for cough.  Dispense: 40 capsule; Refill: 0  I did discuss with the patient at this time I do feel that this is likely more of a bronchitis he was given a prescription for prednisone to be used once a day for the next 7 days he is also given benzonatate pills to help with the cough he was asked to use Benadryl at night as well as to follow-up with his primary care physician.    Meds & Refills for this Visit:  Requested Prescriptions     Signed Prescriptions Disp Refills    predniSONE 50 MG Oral Tab 7 tablet 0     Sig: Take 1 tablet (50 mg total) by mouth daily for 7 days.    benzonatate 200 MG Oral Cap 40 capsule 0     Sig: Take 1 capsule (200 mg  total) by mouth 3 (three) times daily as needed for cough.       Health Maintenance:  Health Maintenance Due   Topic Date Due    HPV Vaccines (3 - 3-dose series) 01/16/2020    Pap Smear  Never done    Annual Physical  08/11/2023    COVID-19 Vaccine (3 - 2023-24 season) 09/01/2023    Tobacco Cessation Counseling  Never done       Patient/Caregiver Education: Patient/Caregiver Education: There are no barriers to learning. Medical education done.   Outcome: Patient verbalizes understanding. Patient is notified to call with any questions, complications, allergies, or worsening or changing symptoms.  Patient is to call with any side effects or complications from the treatments as a result of today.     Problem List:  Patient Active Problem List   Diagnosis    Routine infant or child health check    Bronchitis    Tonsillar hypertrophy         No follow-ups on file.     Brant Corona MD    Please note that portions of this note may have been completed with a voice recognition program. Efforts were made to edit the dictations but occasionally words are mis-transcribed.

## 2024-07-10 ENCOUNTER — HOSPITAL ENCOUNTER (OUTPATIENT)
Age: 24
Discharge: HOME | End: 2024-07-10
Payer: COMMERCIAL

## 2024-07-10 VITALS
HEART RATE: 113 BPM | RESPIRATION RATE: 18 BRPM | DIASTOLIC BLOOD PRESSURE: 89 MMHG | WEIGHT: 180 LBS | BODY MASS INDEX: 27 KG/M2 | OXYGEN SATURATION: 97 % | TEMPERATURE: 100 F | SYSTOLIC BLOOD PRESSURE: 134 MMHG

## 2024-07-10 DIAGNOSIS — J06.9 VIRAL URI WITH COUGH: Primary | ICD-10-CM

## 2024-07-10 LAB
S PYO AG THROAT QL IA.RAPID: NEGATIVE
SARS-COV-2 RNA RESP QL NAA+PROBE: NOT DETECTED

## 2024-07-10 PROCEDURE — 99214 OFFICE O/P EST MOD 30 MIN: CPT

## 2024-07-10 PROCEDURE — 87651 STREP A DNA AMP PROBE: CPT | Performed by: PHYSICIAN ASSISTANT

## 2024-07-10 PROCEDURE — 99213 OFFICE O/P EST LOW 20 MIN: CPT

## 2024-07-10 RX ORDER — ALBUTEROL SULFATE 90 UG/1
2 AEROSOL, METERED RESPIRATORY (INHALATION) EVERY 4 HOURS PRN
Qty: 1 EACH | Refills: 0 | Status: SHIPPED | OUTPATIENT
Start: 2024-07-10 | End: 2024-08-09

## 2024-07-10 RX ORDER — PSEUDOEPHEDRINE HCL 30 MG
30 TABLET ORAL 3 TIMES DAILY
Qty: 15 TABLET | Refills: 0 | Status: SHIPPED | OUTPATIENT
Start: 2024-07-10

## 2024-07-10 RX ORDER — BENZONATATE 100 MG/1
100 CAPSULE ORAL 3 TIMES DAILY PRN
Qty: 30 CAPSULE | Refills: 0 | Status: SHIPPED | OUTPATIENT
Start: 2024-07-10 | End: 2024-08-09

## 2024-07-10 RX ORDER — OXYMETAZOLINE HYDROCHLORIDE 0.05 G/100ML
1 SPRAY NASAL 2 TIMES DAILY
Qty: 15 ML | Refills: 0 | Status: SHIPPED | OUTPATIENT
Start: 2024-07-10 | End: 2024-08-09

## 2024-07-10 NOTE — ED PROVIDER NOTES
Patient Seen in: [facility]      History     Chief Complaint   Patient presents with    Fever     Stated Complaint: SINUS INFECTION -    Subjective:   HPI  Prabhu Calderon is a 23 year old adult presents with acute onset of URI symptoms x 2 days. Patient reports  sinus congestion, non productive cough, rhinorrhea.  Patient denies dysphagia, throat pain, ear pain,  fevers, chills, shortness of breath, respiratory distress, stridor, neck pain/ stiffness, headache, eye pain/ redness, facial/ lip/ eyelid swelling. No medications taken prior to arrival. No alleviating/ aggravating factors. Patient is  concerned about COVID 19 infection at this encounter.  Patient is  immunized for COVID 19.          Objective:   Past Medical History:    Environmental allergies              Past Surgical History:   Procedure Laterality Date    Tonsillectomy                  Social History     Socioeconomic History    Marital status: Single   Tobacco Use    Smoking status: Never    Smokeless tobacco: Never   Vaping Use    Vaping status: Every Day    Substances: Nicotine    Devices: Disposable   Substance and Sexual Activity    Alcohol use: No    Drug use: No    Sexual activity: Not Currently   Other Topics Concern    Caffeine Concern Yes     Comment: soda or coffee occ    Exercise No              Review of Systems   All other systems reviewed and are negative.      Positive for stated Chief Complaint: Fever    Other systems are as noted in HPI.  Constitutional and vital signs reviewed.      All other systems reviewed and negative except as noted above.    Physical Exam     ED Triage Vitals [07/10/24 1606]   /89   Pulse 113   Resp 18   Temp 99.8 °F (37.7 °C)   Temp src Temporal   SpO2 97 %   O2 Device None (Room air)       Current Vitals:   Vital Signs  BP: 134/89  Pulse: 113  Resp: 18  Temp: 99.8 °F (37.7 °C)  Temp src: Temporal    Oxygen Therapy  SpO2: 97 %  O2 Device: None (Room air)            Physical Exam  Vitals  and nursing note reviewed.   Constitutional:       General: Prabhu is not in acute distress.     Appearance: Normal appearance. Prabhu is normal weight. Prabhu is not ill-appearing, toxic-appearing or diaphoretic.   HENT:      Head: Normocephalic and atraumatic.      Right Ear: Tympanic membrane, ear canal and external ear normal.      Left Ear: Tympanic membrane, ear canal and external ear normal.      Nose: Congestion present. No rhinorrhea.      Mouth/Throat:      Mouth: Mucous membranes are moist.      Pharynx: Oropharynx is clear. No oropharyngeal exudate or posterior oropharyngeal erythema.   Eyes:      Extraocular Movements: Extraocular movements intact.      Conjunctiva/sclera: Conjunctivae normal.      Pupils: Pupils are equal, round, and reactive to light.   Pulmonary:      Effort: Pulmonary effort is normal. No respiratory distress.      Breath sounds: No stridor. No wheezing, rhonchi or rales.   Chest:      Chest wall: No tenderness.   Musculoskeletal:         General: No swelling, tenderness, deformity or signs of injury. Normal range of motion.      Cervical back: Normal range of motion and neck supple.   Skin:     General: Skin is warm and dry.      Capillary Refill: Capillary refill takes less than 2 seconds.      Coloration: Skin is not jaundiced or pale.      Findings: No bruising, erythema, lesion or rash.   Neurological:      General: No focal deficit present.      Mental Status: Prabhu is alert and oriented to person, place, and time. Mental status is at baseline.   Psychiatric:         Mood and Affect: Mood normal.         Behavior: Behavior normal.               ED Course     Labs Reviewed   RAPID SARS-COV-2 BY PCR - Normal   RAPID STREP A - Normal          ED Course as of 07/10/24 1725  ------------------------------------------------------------  Time: 07/10 1624  Value: Rapid SARS-CoV-2 by PCR  Comment: Negative                [facility]                                        Medical Decision  Making  Considerations to include but not limited to bronchitis vs pneumonia vs COVID 19 vs influenza A vs influenza B.  Patient is overall well-appearing, normotensive, nontachycardic, not dyspneic with oxygen saturation at 97% on room air  Plan  - COVID 19  swab  - SpO2 97% on room air  - reassess  - DC to home   - rx: albuterol inhaler 1-2 puffs by mouth every 4-6 hours/ prn.   Flonase 2 sprays to bilateral nostrils BID.  Afrin 2 sprays to bilateral nostrils BID for no more than 48 consecutive hours to avoid rebound congestion.  Sudafed 30mg po q 6 hours.   Tessalon 100mg PO TID.    - refer to PCP for further evaluation    - return to ED      Amount and/or Complexity of Data Reviewed  Labs: ordered. Decision-making details documented in ED Course.     Details: COVID 19- negative           Disposition and Plan     Clinical Impression:  1. Viral URI with cough         Disposition:  Discharge  7/10/2024  5:25 pm    Follow-up:  No follow-up provider specified.        Medications Prescribed:  Current Discharge Medication List        START taking these medications    Details   benzonatate 100 MG Oral Cap Take 1 capsule (100 mg total) by mouth 3 (three) times daily as needed for cough.  Qty: 30 capsule, Refills: 0      oxymetazoline 0.05 % Nasal Solution 1 spray by Nasal route 2 (two) times daily.  Qty: 15 mL, Refills: 0      pseudoephedrine 30 MG Oral Tab Take 1 tablet (30 mg total) by mouth in the morning, at noon, and at bedtime.  Qty: 15 tablet, Refills: 0    Associated Diagnoses: Viral URI with cough      !! albuterol 108 (90 Base) MCG/ACT Inhalation Aero Soln Inhale 2 puffs into the lungs every 4 (four) hours as needed for Wheezing.  Qty: 1 each, Refills: 0       !! - Potential duplicate medications found. Please discuss with provider.

## 2024-07-10 NOTE — ED INITIAL ASSESSMENT (HPI)
Pt c/o fever, bodyaches, bilat ear pain and sorethroat starting 2 days ago, pt statin g that he caught a sinus infection from 3 family menbers who are improved at this time, mom was neg for covid and flu

## 2024-09-01 ENCOUNTER — TELEPHONE (OUTPATIENT)
Dept: FAMILY MEDICINE CLINIC | Facility: CLINIC | Age: 24
End: 2024-09-01

## 2024-09-01 ENCOUNTER — OFFICE VISIT (OUTPATIENT)
Dept: FAMILY MEDICINE CLINIC | Facility: CLINIC | Age: 24
End: 2024-09-01
Payer: COMMERCIAL

## 2024-09-01 VITALS
TEMPERATURE: 98 F | DIASTOLIC BLOOD PRESSURE: 78 MMHG | SYSTOLIC BLOOD PRESSURE: 122 MMHG | WEIGHT: 175 LBS | HEART RATE: 77 BPM | RESPIRATION RATE: 18 BRPM | BODY MASS INDEX: 25.92 KG/M2 | HEIGHT: 69 IN | OXYGEN SATURATION: 97 %

## 2024-09-01 DIAGNOSIS — K52.9 GASTROENTERITIS: Primary | ICD-10-CM

## 2024-09-01 PROCEDURE — 3008F BODY MASS INDEX DOCD: CPT | Performed by: NURSE PRACTITIONER

## 2024-09-01 PROCEDURE — 3078F DIAST BP <80 MM HG: CPT | Performed by: NURSE PRACTITIONER

## 2024-09-01 PROCEDURE — 3074F SYST BP LT 130 MM HG: CPT | Performed by: NURSE PRACTITIONER

## 2024-09-01 PROCEDURE — 99213 OFFICE O/P EST LOW 20 MIN: CPT | Performed by: NURSE PRACTITIONER

## 2024-09-01 RX ORDER — ONDANSETRON 4 MG/1
4 TABLET, ORALLY DISINTEGRATING ORAL EVERY 8 HOURS PRN
Qty: 15 TABLET | Refills: 0 | Status: SHIPPED | OUTPATIENT
Start: 2024-09-01 | End: 2024-09-06

## 2024-09-01 NOTE — PROGRESS NOTES
CHIEF COMPLAINT:     Chief Complaint   Patient presents with    Nausea     Symptoms since Tuesday : Nausea and diarrhea ( 6 episodes)   OTC: Pepto          HPI:   Prabhu Calderon is a 23 year old adult who presents for complaints of nausea, diarrhea.  Symptoms have been present for 4  days.  Frequency: diarrhea x 6 episodes over 4d.  Associated symptoms: bloating. Symptoms are worsened by eating. Prior abdomial hx: none.   Appetite is not diminished.  Last successful oral intake today.  Denies moderate to severe abdominal pain.  no new foods. No fever, no vomit    Current Outpatient Medications   Medication Sig Dispense Refill    ondansetron 4 MG Oral Tablet Dispersible Take 1 tablet (4 mg total) by mouth every 8 (eight) hours as needed for Nausea. 15 tablet 0    pseudoephedrine 30 MG Oral Tab Take 1 tablet (30 mg total) by mouth in the morning, at noon, and at bedtime. (Patient not taking: Reported on 9/1/2024) 15 tablet 0    Albuterol Sulfate  (90 Base) MCG/ACT Inhalation Aero Soln Inhale 2 puffs into the lungs every 6 (six) hours as needed for Wheezing. 1 Inhaler 0      Past Medical History:    Environmental allergies      Social History:  Social History     Socioeconomic History    Marital status: Single   Tobacco Use    Smoking status: Never    Smokeless tobacco: Never   Vaping Use    Vaping status: Every Day    Substances: Nicotine    Devices: Disposable   Substance and Sexual Activity    Alcohol use: No    Drug use: No    Sexual activity: Not Currently   Other Topics Concern    Caffeine Concern Yes     Comment: soda or coffee occ    Exercise No        REVIEW OF SYSTEMS:   GENERAL HEALTH: no chills, no fever, no malaise  SKIN: denies any unusual skin lesions or rashes  HEENT: denies ear pain, congestion, or sore throat  CARDIOVASCULAR: denies chest pain or palpitations  RESPIRATORY: denies shortness of breath, cough or wheezing  GI:See HPI  NEURO: denies headaches, loss of bowel or bladder  control    EXAM:   /78   Pulse 77   Temp 97.9 °F (36.6 °C) (Temporal)   Resp 18   Ht 5' 9\" (1.753 m)   Wt 175 lb (79.4 kg)   SpO2 97%   BMI 25.84 kg/m²   GENERAL: well developed, well nourished,in no apparent distress  SKIN: no rashes  HEAD: atraumatic, normocephalic,   EYES: conjunctiva clear, EOM intact  EARS: TM's clear, no bulging, erythema or fluid bilaterally  NOSE: nostrils patent. Nasal mucosa pink and without exudates.   THROAT: oral mucosa pink, moist. Posterior pharynx is not erythematous. No exudates.  NECK: supple,no adenopathy  LUNGS: clear to auscultation bilaterally. No wheezing, rales, or rhonchi.    CARDIO: RRR without murmur  GI: No visible scars or masses. BS's present x4.  No palpable masses or hepatosplenomegaly.  no tenderness upon palpation in 4 quadrants. No rebound tenderness. Negative fuentes's sign.   EXTREMITIES: no cyanosis, clubbing or edema    ASSESSMENT AND PLAN:   ASSESSMENT:  Encounter Diagnosis   Name Primary?    Gastroenteritis Yes       PLAN:   Advised pt sx are likely viral in nature  Rest, push fluids, supportive care  Comfort care as in pt instructions  Proper diet discussed.    To ED for moderate to severe abdominal pain, dehydration, blood in stool, or new onset of fever.    The patient indicates understanding of these issues and agrees to the plan.  The patient is asked to see PCP if no improvement in 3-5 days.

## 2024-09-01 NOTE — TELEPHONE ENCOUNTER
Called pharmacy to discuss authorization issue with zofran.   Pharmacist was able to learn that insurance will not authorize more than 9 in 25 days.   Changed quantity from 15 to 9 and insurance approved.   Called patient and let him know that the issue was resolved.

## 2024-10-09 ENCOUNTER — HOSPITAL ENCOUNTER (EMERGENCY)
Facility: HOSPITAL | Age: 24
Discharge: HOME | End: 2024-10-09
Attending: EMERGENCY MEDICINE
Payer: COMMERCIAL

## 2024-10-09 VITALS
TEMPERATURE: 99 F | RESPIRATION RATE: 16 BRPM | SYSTOLIC BLOOD PRESSURE: 144 MMHG | BODY MASS INDEX: 26.36 KG/M2 | HEIGHT: 69 IN | DIASTOLIC BLOOD PRESSURE: 80 MMHG | HEART RATE: 73 BPM | WEIGHT: 178 LBS | OXYGEN SATURATION: 97 %

## 2024-10-09 DIAGNOSIS — S09.90XA MINOR HEAD INJURY, INITIAL ENCOUNTER: Primary | ICD-10-CM

## 2024-10-09 PROCEDURE — 99283 EMERGENCY DEPT VISIT LOW MDM: CPT
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
## 2024-11-19 ENCOUNTER — HOSPITAL ENCOUNTER (OUTPATIENT)
Age: 24
Discharge: HOME | End: 2024-11-19
Payer: COMMERCIAL

## 2024-11-19 VITALS
TEMPERATURE: 99 F | HEART RATE: 72 BPM | HEIGHT: 69 IN | SYSTOLIC BLOOD PRESSURE: 132 MMHG | WEIGHT: 165 LBS | OXYGEN SATURATION: 96 % | DIASTOLIC BLOOD PRESSURE: 83 MMHG | RESPIRATION RATE: 18 BRPM | BODY MASS INDEX: 24.44 KG/M2

## 2024-11-19 DIAGNOSIS — T23.212A PARTIAL THICKNESS BURN OF LEFT THUMB, INITIAL ENCOUNTER: ICD-10-CM

## 2024-11-19 DIAGNOSIS — T23.201A PARTIAL THICKNESS BURN OF MULTIPLE SITES OF RIGHT HAND, INITIAL ENCOUNTER: Primary | ICD-10-CM

## 2024-11-19 PROCEDURE — 99213 OFFICE O/P EST LOW 20 MIN: CPT

## 2024-11-19 PROCEDURE — 99214 OFFICE O/P EST MOD 30 MIN: CPT

## 2024-11-19 PROCEDURE — 16020 DRESS/DEBRID P-THICK BURN S: CPT

## 2024-11-19 RX ORDER — SILVER SULFADIAZINE 10 MG/G
CREAM TOPICAL ONCE
Status: COMPLETED | OUTPATIENT
Start: 2024-11-19 | End: 2024-11-19

## 2024-11-19 RX ORDER — SILVER SULFADIAZINE 10 MG/G
CREAM TOPICAL
Qty: 1 EACH | Refills: 0 | Status: SHIPPED | OUTPATIENT
Start: 2024-11-19

## 2024-11-19 RX ORDER — IBUPROFEN 600 MG/1
600 TABLET, FILM COATED ORAL ONCE
Status: COMPLETED | OUTPATIENT
Start: 2024-11-19 | End: 2024-11-19
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
## 2024-11-25 ENCOUNTER — OFFICE VISIT (OUTPATIENT)
Dept: FAMILY MEDICINE CLINIC | Facility: CLINIC | Age: 24
End: 2024-11-25
Payer: COMMERCIAL

## 2024-11-25 VITALS
RESPIRATION RATE: 16 BRPM | DIASTOLIC BLOOD PRESSURE: 68 MMHG | HEIGHT: 69 IN | TEMPERATURE: 97 F | HEART RATE: 64 BPM | BODY MASS INDEX: 25.18 KG/M2 | SYSTOLIC BLOOD PRESSURE: 108 MMHG | WEIGHT: 170 LBS

## 2024-11-25 DIAGNOSIS — T23.201D PARTIAL THICKNESS BURN OF MULTIPLE SITES OF RIGHT HAND, SUBSEQUENT ENCOUNTER: Primary | ICD-10-CM

## 2024-11-25 PROCEDURE — 3074F SYST BP LT 130 MM HG: CPT | Performed by: FAMILY MEDICINE

## 2024-11-25 PROCEDURE — G2211 COMPLEX E/M VISIT ADD ON: HCPCS | Performed by: FAMILY MEDICINE

## 2024-11-25 PROCEDURE — 3008F BODY MASS INDEX DOCD: CPT | Performed by: FAMILY MEDICINE

## 2024-11-25 PROCEDURE — 99213 OFFICE O/P EST LOW 20 MIN: CPT | Performed by: FAMILY MEDICINE

## 2024-11-25 PROCEDURE — 3078F DIAST BP <80 MM HG: CPT | Performed by: FAMILY MEDICINE
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
## 2025-08-01 ENCOUNTER — OFFICE VISIT (OUTPATIENT)
Dept: FAMILY MEDICINE CLINIC | Facility: CLINIC | Age: 25
End: 2025-08-01

## 2025-08-01 VITALS
OXYGEN SATURATION: 98 % | BODY MASS INDEX: 23.11 KG/M2 | WEIGHT: 156 LBS | TEMPERATURE: 99 F | HEIGHT: 69 IN | SYSTOLIC BLOOD PRESSURE: 116 MMHG | RESPIRATION RATE: 18 BRPM | DIASTOLIC BLOOD PRESSURE: 79 MMHG | HEART RATE: 61 BPM

## 2025-08-01 DIAGNOSIS — H65.193 ACUTE MEE (MIDDLE EAR EFFUSION), BILATERAL: Primary | ICD-10-CM

## 2025-08-01 DIAGNOSIS — J06.9 VIRAL URI: ICD-10-CM

## 2025-08-01 PROCEDURE — 3078F DIAST BP <80 MM HG: CPT | Performed by: NURSE PRACTITIONER

## 2025-08-01 PROCEDURE — 3074F SYST BP LT 130 MM HG: CPT | Performed by: NURSE PRACTITIONER

## 2025-08-01 PROCEDURE — 99213 OFFICE O/P EST LOW 20 MIN: CPT | Performed by: NURSE PRACTITIONER

## 2025-08-01 PROCEDURE — 3008F BODY MASS INDEX DOCD: CPT | Performed by: NURSE PRACTITIONER

## (undated) NOTE — LETTER
Date: 9/12/2017    Patient Name: Hilda Vasques          To Whom it may concern: The above patient was seen at the [facility] for treatment of a medical condition. The patient may return to school this morning with no limitations.

## (undated) NOTE — LETTER
Date: 9/17/2018    Patient Name: Mony Little          To Whom it may concern: This letter has been written at the patient's request. The above patient was seen at the [facility] for treatment of a medical condition.   Please excuse him fr

## (undated) NOTE — MR AVS SNAPSHOT
[facility] [address] [facility]  [address] [facility]  [place] [address]  [phone]               Thank you for choosing us for your health care visit with SELENA Pizano. We are glad to serve you and happy to provide you with this summary of your visit.   Please he your child’s sore throat at home. To do this:  · Give your child acetaminophen or ibuprofen to ease the pain. Don't use ibuprofen in children younger than 10months of age or in children who are dehydrated or vomiting all of the time.  Don’t give your child Declan [phone]   [phone]              Allergies as of Feb 19, 2017     No Known Allergies                Today's Vital Signs     BP Pulse    100/62 mmHg (7 %, Z = -1.50 / 38 %, Z = -0.30*) 74    Temp Height    98 °F (36.7 °C o 3 servings of low-fat dairy a day  o 2 or less hours of screen time a day  o 1 or more hours of physical activity a day    To help children live healthy active lives, parents can:  o Be role models themselves by making healthy eating and daily physical a

## (undated) NOTE — LETTER
Date & Time: 11/12/2023, 3:50 PM  Patient: Ruth Nett  Encounter Provider(s):    Moraima Banerjee PA-C       To Whom It May Concern:    Aurea Chamberlain was seen and treated in our department on 11/12/2023. Ruth Nett patient should not return to work until fever free for at least 24 hours and symptomatically improved.     If you have any questions or concerns, please do not hesitate to call.        _____________________________  Physician/APC Signature

## (undated) NOTE — Clinical Note
I saw Leonard Gorman in the [facility] in [facility] today for right eye irritation,  he was treated with Erythromycin ointment. Leonard Gorman will follow up with you if no better or as needed.  Thank you for the opportunity to care for SELENA Iraheta

## (undated) NOTE — Clinical Note
I saw Terry Davis in the [employer] in [facility] today for acute pharyngitis,  he was treated with supportive care. Throat culture is pending. Terry Davis will follow up with you if no better or as needed.  Thank you for the opportunity to care for Terry Davis today-Mervat Cardenas

## (undated) NOTE — LETTER
Date: 3/8/2019    Patient Name: sIabel Felipe          To Whom it may concern: This letter has been written at the patient's request. The above patient was seen at the [facility] for treatment of a medical condition.     This patient should

## (undated) NOTE — LETTER
Date: 1/16/2020    Patient Name: Dennie Goring          To Whom it may concern: The above patient was seen at the [facility] for evaluation of a medical condition. This patient should be excused from attending school through 1/17/2020.

## (undated) NOTE — LETTER
Date & Time: 11/19/2024, 4:11 PM  Patient: Prabhu Calderon  Encounter Provider(s):    Jocelyn Guthrie APRN       To Whom It May Concern:    Prabhu Calderon was seen and treated in our department on 11/19/2024. Prabhu can return to work with these limitations: No strong gripping with bilateral hands for 1 week .    If you have any questions or concerns, please do not hesitate to call.        _____________________________  Physician/APC Signature